# Patient Record
Sex: MALE | Race: WHITE | NOT HISPANIC OR LATINO | Employment: FULL TIME | URBAN - METROPOLITAN AREA
[De-identification: names, ages, dates, MRNs, and addresses within clinical notes are randomized per-mention and may not be internally consistent; named-entity substitution may affect disease eponyms.]

---

## 2017-04-20 ENCOUNTER — OFFICE VISIT (OUTPATIENT)
Dept: URGENT CARE | Facility: MEDICAL CENTER | Age: 47
End: 2017-04-20
Payer: COMMERCIAL

## 2017-04-20 ENCOUNTER — HOSPITAL ENCOUNTER (OUTPATIENT)
Dept: RADIOLOGY | Facility: MEDICAL CENTER | Age: 47
Discharge: HOME | End: 2017-04-20
Admitting: FAMILY MEDICINE
Payer: COMMERCIAL

## 2017-04-20 DIAGNOSIS — R10.9 ABDOMINAL PAIN: ICD-10-CM

## 2017-04-20 PROCEDURE — 99203 OFFICE O/P NEW LOW 30 MIN: CPT

## 2017-04-20 PROCEDURE — 81002 URINALYSIS NONAUTO W/O SCOPE: CPT

## 2017-07-16 ENCOUNTER — OFFICE VISIT (OUTPATIENT)
Dept: URGENT CARE | Facility: MEDICAL CENTER | Age: 47
End: 2017-07-16
Payer: COMMERCIAL

## 2017-07-16 PROCEDURE — 12042 INTMD RPR N-HF/GENIT2.6-7.5: CPT

## 2017-07-16 PROCEDURE — 99213 OFFICE O/P EST LOW 20 MIN: CPT

## 2017-07-27 ENCOUNTER — APPOINTMENT (OUTPATIENT)
Dept: LAB | Facility: HOSPITAL | Age: 47
End: 2017-07-27
Payer: COMMERCIAL

## 2017-07-27 ENCOUNTER — OFFICE VISIT (OUTPATIENT)
Dept: URGENT CARE | Facility: MEDICAL CENTER | Age: 47
End: 2017-07-27
Payer: COMMERCIAL

## 2017-07-27 DIAGNOSIS — S61.212A LACERATION OF RIGHT MIDDLE FINGER WITHOUT FOREIGN BODY WITHOUT DAMAGE TO NAIL: ICD-10-CM

## 2017-07-27 PROCEDURE — 87077 CULTURE AEROBIC IDENTIFY: CPT

## 2017-07-27 PROCEDURE — 87070 CULTURE OTHR SPECIMN AEROBIC: CPT

## 2017-07-27 PROCEDURE — 87186 SC STD MICRODIL/AGAR DIL: CPT

## 2017-07-27 PROCEDURE — 87205 SMEAR GRAM STAIN: CPT

## 2017-07-27 PROCEDURE — 99213 OFFICE O/P EST LOW 20 MIN: CPT

## 2017-07-29 ENCOUNTER — GENERIC CONVERSION - ENCOUNTER (OUTPATIENT)
Dept: OTHER | Facility: OTHER | Age: 47
End: 2017-07-29

## 2017-07-29 LAB
BACTERIA WND AEROBE CULT: NORMAL
BACTERIA WND AEROBE CULT: NORMAL
GRAM STN SPEC: NORMAL
GRAM STN SPEC: NORMAL

## 2018-01-11 NOTE — MISCELLANEOUS
Message  I spoke with the patient  I informed them that wound culture result reveals resistant to amoxicillin which she is currently taking and I need to switch his antibiotic  I called in prescription to his local pharmacy which she is to take his first dose tonight  He expressed understanding        Signatures   Electronically signed by : LIZA Russell ; Jul 29 2017 10:29AM EST                       (Author)

## 2019-02-18 ENCOUNTER — TELEPHONE (OUTPATIENT)
Dept: CARDIOLOGY CLINIC | Facility: CLINIC | Age: 49
End: 2019-02-18

## 2019-02-18 NOTE — TELEPHONE ENCOUNTER
Pt requesting lab slip for labs to be done while he is in [place]   No orders in Epic please advise what labs you would like and I will mail slip to patient in [facility]LA---[phone] Mistry [address] [facility]

## 2021-07-27 ENCOUNTER — OFFICE VISIT (OUTPATIENT)
Dept: URGENT CARE | Facility: CLINIC | Age: 51
End: 2021-07-27
Payer: COMMERCIAL

## 2021-07-27 VITALS
TEMPERATURE: 98.3 F | RESPIRATION RATE: 18 BRPM | HEART RATE: 59 BPM | DIASTOLIC BLOOD PRESSURE: 58 MMHG | SYSTOLIC BLOOD PRESSURE: 119 MMHG | OXYGEN SATURATION: 98 %

## 2021-07-27 DIAGNOSIS — L95.9 CUTANEOUS VASCULITIS: Primary | ICD-10-CM

## 2021-07-27 PROCEDURE — 99213 OFFICE O/P EST LOW 20 MIN: CPT | Performed by: PHYSICIAN ASSISTANT

## 2021-07-27 NOTE — PROGRESS NOTES
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]  1  Cutaneous vasculitis         Patient Instructions   Patient Instructions     This appears to be a cutaneous vasculitis probably related to your COVID vaccine  You have pictures on your phone from when it appeared to be the worst   Save these for documentation  Call your primary care provider to arrange follow-up for the next 5-7 days  You may discuss with your PCP about getting 2nd COVID vaccine  Your primary care provider may want you to see a dermatologist       Recommend taking any kind of medication that may increase bleeding such as ibuprofen, aspirin products  Tylenol safe to take  If skin becomes itchy may use over-the-counter cortisone cream but otherwise do not have to use any medications at this time  If you would develop any sudden weakness, dizziness, unusual bleeding proceed to emergency room for further evaluation  Chief Complaint     Chief Complaint   Patient presents with    Rash     Pt states had his first covid vaccine on friday, saturday developed flu like symptoms and  was recovering states monday he felt fine and went to work  After coming home family members noticed rash to bilateral lower legs  Denies pain or itching  History of Present Illness   Kala Hilario presents to the clinic c/o    59-year-old male comes in with rash bilateral lower legs that seem to be 1st seen on Monday after he got home from work  Asymptomatic  Feeling well overall  Patient had his 1st COVID vaccine on Friday evening around 530  By Saturday afternoon he was down and out with headache, body aches and pains, fever, chills  By  he was feeling better  Denies any active bleeding that he is aware of  Did have dark formed bowel movement once on Saturday    No weakness, chest pain, shortness of breath  No prolonged sun exposure  Review of Systems   Review of Systems   Constitutional: Negative  Respiratory: Negative  Cardiovascular: Negative  Gastrointestinal: Negative for abdominal pain  Skin: Positive for rash  Current Medications     No long-term medications on file  Current Allergies     Allergies as of 07/27/2021    (No Known Allergies)          The following portions of the patient's history were reviewed and updated as appropriate: allergies, current medications, past family history, past medical history, past social history, past surgical history and problem list   History reviewed  No pertinent past medical history  History reviewed  No pertinent surgical history  History reviewed  No pertinent family history  Objective   /58   Pulse 59   Temp 98 3 °F (36 8 °C)   Resp 18   SpO2 98%   No LMP for male patient  Physical Exam     Physical Exam  Vitals and nursing note reviewed  Constitutional:       General: He is not in acute distress  Appearance: Normal appearance  He is well-developed  He is not ill-appearing, toxic-appearing or diaphoretic  Comments: Accompanied by wife   HENT:      Head: Normocephalic and atraumatic  Eyes:      General: No scleral icterus  Extraocular Movements: Extraocular movements intact  Conjunctiva/sclera: Conjunctivae normal       Pupils: Pupils are equal, round, and reactive to light  Cardiovascular:      Rate and Rhythm: Normal rate and regular rhythm  Heart sounds: Normal heart sounds  No murmur heard  No friction rub  No gallop  Pulmonary:      Effort: Pulmonary effort is normal  No respiratory distress  Breath sounds: Normal breath sounds  No stridor  No wheezing, rhonchi or rales  Abdominal:      Palpations: Abdomen is soft  There is no mass  Tenderness: There is no abdominal tenderness  There is no guarding        Comments: No hepatosplenomegaly Skin:     General: Skin is warm and dry  Findings: Erythema and rash present  Comments: Nonblanching purpura noted bilateral legs proximal to sock line and distal to knees  No similar  Lesions noted arms, chest, back, face  No similar lesions on thighs  Neurological:      Mental Status: He is alert and oriented to person, place, and time

## 2021-07-27 NOTE — PATIENT INSTRUCTIONS
This appears to be a cutaneous vasculitis probably related to your COVID vaccine  You have pictures on your phone from when it appeared to be the worst   Save these for documentation  Call your primary care provider to arrange follow-up for the next 5-7 days  You may discuss with your PCP about getting 2nd COVID vaccine  Your primary care provider may want you to see a dermatologist       Recommend taking any kind of medication that may increase bleeding such as ibuprofen, aspirin products  Tylenol safe to take  If skin becomes itchy may use over-the-counter cortisone cream but otherwise do not have to use any medications at this time  If you would develop any sudden weakness, dizziness, unusual bleeding proceed to emergency room for further evaluation

## 2024-03-19 ENCOUNTER — APPOINTMENT (OUTPATIENT)
Dept: URGENT CARE | Facility: MEDICAL CENTER | Age: 54
End: 2024-03-19

## 2024-07-10 ENCOUNTER — OFFICE VISIT (OUTPATIENT)
Dept: FAMILY MEDICINE CLINIC | Facility: CLINIC | Age: 54
End: 2024-07-10
Payer: COMMERCIAL

## 2024-07-10 VITALS
HEART RATE: 64 BPM | BODY MASS INDEX: 26.74 KG/M2 | DIASTOLIC BLOOD PRESSURE: 58 MMHG | SYSTOLIC BLOOD PRESSURE: 110 MMHG | WEIGHT: 197.4 LBS | TEMPERATURE: 98.5 F | OXYGEN SATURATION: 98 % | HEIGHT: 72 IN

## 2024-07-10 DIAGNOSIS — Z12.11 SCREEN FOR COLON CANCER: ICD-10-CM

## 2024-07-10 DIAGNOSIS — Z13.1 SCREENING FOR DIABETES MELLITUS: ICD-10-CM

## 2024-07-10 DIAGNOSIS — Z13.0 SCREENING FOR DEFICIENCY ANEMIA: ICD-10-CM

## 2024-07-10 DIAGNOSIS — Z00.00 ANNUAL PHYSICAL EXAM: Primary | ICD-10-CM

## 2024-07-10 DIAGNOSIS — Z13.29 SCREENING FOR THYROID DISORDER: ICD-10-CM

## 2024-07-10 DIAGNOSIS — Z13.6 SCREENING FOR CARDIOVASCULAR CONDITION: ICD-10-CM

## 2024-07-10 DIAGNOSIS — Z12.5 SCREENING FOR PROSTATE CANCER: ICD-10-CM

## 2024-07-10 PROCEDURE — 99386 PREV VISIT NEW AGE 40-64: CPT | Performed by: FAMILY MEDICINE

## 2024-07-10 NOTE — PROGRESS NOTES
Adult Annual Physical  Name: Darien Seth      : 1970      MRN: [id number]  Encounter Provider: Jovanni Ozuna DO  Encounter Date: 7/10/2024   Encounter department: [facility]    Assessment & Plan   1. Annual physical exam  Assessment & Plan:  Patient was seen for annual physical and introductory visit.  She has no complaints.  Past medical history reviewed.  Immunizations discussed.  He is due for shingles but would like to defer for the time being.  We will refer him for routine baseline screening colonoscopy and for lab work.  2. Screen for colon cancer  -     Ambulatory Referral to Gastroenterology; Future  3. Screening for deficiency anemia  -     CBC and differential; Future  -     CBC and differential  4. Screening for diabetes mellitus  -     Comprehensive metabolic panel; Future  -     Comprehensive metabolic panel  5. Screening for prostate cancer  -     PSA, Total Screen; Future  -     PSA, Total Screen  6. Screening for thyroid disorder  -     TSH, 3rd generation with Free T4 reflex; Future  -     TSH, 3rd generation with Free T4 reflex  7. Screening for cardiovascular condition  -     Lipid Panel with Direct LDL reflex; Future  -     Lipid Panel with Direct LDL reflex    Immunizations and preventive care screenings were discussed with patient today. Appropriate education was printed on patient's after visit summary.    Discussed risks and benefits of prostate cancer screening. We discussed the controversial history of PSA screening for prostate cancer in the [place] as well as the risk of over detection and over treatment of prostate cancer by way of PSA screening.  The patient understands that PSA blood testing is an imperfect way to screen for prostate cancer and that elevated PSA levels in the blood may also be caused by infection, inflammation, prostatic trauma or manipulation, urological procedures, or by benign prostatic enlargement.    The role of the  digital rectal examination in prostate cancer screening was also discussed and I discussed with him that there is large interobserver variability in the findings of digital rectal examination.    Counseling:  Alcohol/drug use: discussed moderation in alcohol intake, the recommendations for healthy alcohol use, and avoidance of illicit drug use.  Dental Health: discussed importance of regular tooth brushing, flossing, and dental visits.  Injury prevention: discussed safety/seat belts, safety helmets, smoke detectors, carbon dioxide detectors, and smoking near bedding or upholstery.  Exercise: the importance of regular exercise/physical activity was discussed. Recommend exercise 3-5 times per week for at least 30 minutes.          History of Present Illness     Adult Annual Physical:  Patient presents for annual physical.     Diet and Physical Activity:  - Diet/Nutrition: low fat diet. limited veg  - Exercise: 5-7 times a week on average, strength training exercises and moderate cardiovascular exercise.    Depression Screening:  - PHQ-2 Score: 0    General Health:  - Sleep: 4-6 hours of sleep on average.  - Hearing: normal hearing bilateral ears.  - Vision: no vision problems and most recent eye exam < 1 year ago.  - Dental: regular dental visits.     Health:    - Urinary symptoms: none.     Review of Systems   Constitutional: Negative.    HENT: Negative.  Negative for congestion, ear pain, hearing loss, nosebleeds, sore throat and trouble swallowing.    Eyes: Negative.    Respiratory:  Negative for apnea, cough, chest tightness, shortness of breath and wheezing.    Cardiovascular: Negative.    Gastrointestinal:  Negative for abdominal pain, blood in stool, constipation, diarrhea, nausea and vomiting.   Endocrine: Negative.    Genitourinary:  Negative for difficulty urinating, dysuria, frequency, hematuria and urgency.   Musculoskeletal:  Negative for arthralgias, joint swelling and myalgias.   Skin:  Negative for  "rash.   Neurological:  Negative for dizziness, syncope, light-headedness, numbness and headaches.   Hematological: Negative.    Psychiatric/Behavioral:  Negative for confusion, dysphoric mood and sleep disturbance. The patient is not nervous/anxious.          Objective     /58 (BP Location: Left arm, Patient Position: Sitting, Cuff Size: Adult)   Pulse 64   Temp 98.5 °F (36.9 °C)   Ht 5' 11.65\" (1.82 m)   Wt 89.5 kg (197 lb 6.4 oz)   SpO2 98%   BMI 27.03 kg/m²     Physical Exam  Vitals and nursing note reviewed.   Constitutional:       Appearance: Normal appearance. He is well-developed.   HENT:      Head: Normocephalic.      Right Ear: External ear normal.      Left Ear: External ear normal.      Nose: Nose normal.      Mouth/Throat:      Mouth: Mucous membranes are moist.   Eyes:      Conjunctiva/sclera: Conjunctivae normal.      Pupils: Pupils are equal, round, and reactive to light.   Cardiovascular:      Rate and Rhythm: Normal rate and regular rhythm.      Heart sounds: Normal heart sounds.   Pulmonary:      Effort: Pulmonary effort is normal.      Breath sounds: Normal breath sounds.   Abdominal:      General: Bowel sounds are normal.      Palpations: Abdomen is soft.   Musculoskeletal:         General: Normal range of motion.      Cervical back: Normal range of motion and neck supple.   Skin:     General: Skin is warm and dry.   Neurological:      Mental Status: He is alert.   Psychiatric:         Mood and Affect: Mood normal.         Behavior: Behavior normal.         Thought Content: Thought content normal.         Judgment: Judgment normal.         "

## 2024-07-10 NOTE — ASSESSMENT & PLAN NOTE
Patient was seen for annual physical and introductory visit.  She has no complaints.  Past medical history reviewed.  Immunizations discussed.  He is due for shingles but would like to defer for the time being.  We will refer him for routine baseline screening colonoscopy and for lab work.

## 2024-07-27 LAB
ALBUMIN SERPL-MCNC: 4.3 G/DL (ref 3.5–5.7)
ALP SERPL-CCNC: 53 U/L (ref 35–120)
ALT SERPL-CCNC: 14 U/L
ANION GAP SERPL CALCULATED.3IONS-SCNC: 7 MMOL/L (ref 3–11)
AST SERPL-CCNC: 20 U/L
BASOPHILS # BLD AUTO: 0 THOU/CMM (ref 0–0.1)
BASOPHILS NFR BLD AUTO: 1 %
BILIRUB SERPL-MCNC: 0.6 MG/DL (ref 0.2–1)
BUN SERPL-MCNC: 17 MG/DL (ref 7–28)
CALCIUM SERPL-MCNC: 9 MG/DL (ref 8.5–10.1)
CHLORIDE SERPL-SCNC: 104 MMOL/L (ref 100–109)
CHOLEST SERPL-MCNC: 150 MG/DL
CHOLEST/HDLC SERPL: 3.1 {RATIO}
CO2 SERPL-SCNC: 30 MMOL/L (ref 21–31)
CREAT SERPL-MCNC: 1.04 MG/DL (ref 0.53–1.3)
CYTOLOGY CMNT CVX/VAG CYTO-IMP: ABNORMAL
DIFFERENTIAL METHOD BLD: NORMAL
EOSINOPHIL # BLD AUTO: 0.2 THOU/CMM (ref 0–0.5)
EOSINOPHIL NFR BLD AUTO: 3 %
ERYTHROCYTE [DISTWIDTH] IN BLOOD BY AUTOMATED COUNT: 13.8 % (ref 12–16)
GFR/BSA.PRED SERPLBLD CYS-BASED-ARV: 85 ML/MIN/{1.73_M2}
GLUCOSE SERPL-MCNC: 84 MG/DL (ref 65–99)
HCT VFR BLD AUTO: 42.2 % (ref 37–48)
HDLC SERPL-MCNC: 48 MG/DL (ref 23–92)
HGB BLD-MCNC: 14.4 G/DL (ref 12.5–17)
LDLC SERPL CALC-MCNC: 86 MG/DL
LYMPHOCYTES # BLD AUTO: 1.7 THOU/CMM (ref 1–3)
LYMPHOCYTES NFR BLD AUTO: 31 %
MCH RBC QN AUTO: 30 PG (ref 27–36)
MCHC RBC AUTO-ENTMCNC: 34.1 G/DL (ref 32–37)
MCV RBC AUTO: 88 FL (ref 80–100)
MONOCYTES # BLD AUTO: 0.7 THOU/CMM (ref 0.3–1)
MONOCYTES NFR BLD AUTO: 12 %
NEUTROPHILS # BLD AUTO: 3 THOU/CMM (ref 1.8–7.8)
NEUTROPHILS NFR BLD AUTO: 53 %
NONHDLC SERPL-MCNC: 102 MG/DL
PLATELET # BLD AUTO: 304 THOU/CMM (ref 140–350)
PMV BLD REES-ECKER: 8.8 FL (ref 7.5–11.3)
POTASSIUM SERPL-SCNC: 4.9 MMOL/L (ref 3.5–5.2)
PROT SERPL-MCNC: 6.1 G/DL (ref 6.3–8.3)
PSA SERPL-MCNC: 0.64 NG/ML
RBC # BLD AUTO: 4.8 MILL/CMM (ref 4–5.4)
SODIUM SERPL-SCNC: 141 MMOL/L (ref 135–145)
TRIGL SERPL-MCNC: 81 MG/DL
TSH SERPL-ACNC: 3.7 UIU/ML (ref 0.45–5.33)
WBC # BLD AUTO: 5.6 THOU/CMM (ref 4–10.5)

## 2024-08-06 ENCOUNTER — OFFICE VISIT (OUTPATIENT)
Dept: GASTROENTEROLOGY | Facility: CLINIC | Age: 54
End: 2024-08-06
Payer: COMMERCIAL

## 2024-08-06 VITALS
SYSTOLIC BLOOD PRESSURE: 124 MMHG | TEMPERATURE: 97.8 F | HEART RATE: 57 BPM | DIASTOLIC BLOOD PRESSURE: 58 MMHG | WEIGHT: 194 LBS | HEIGHT: 73 IN | OXYGEN SATURATION: 99 % | BODY MASS INDEX: 25.71 KG/M2

## 2024-08-06 DIAGNOSIS — Z12.11 SCREEN FOR COLON CANCER: ICD-10-CM

## 2024-08-06 PROCEDURE — 99243 OFF/OP CNSLTJ NEW/EST LOW 30: CPT | Performed by: INTERNAL MEDICINE

## 2024-08-06 RX ORDER — RIBOFLAVIN (VITAMIN B2) 100 MG
100 TABLET ORAL DAILY
COMMUNITY

## 2024-08-06 NOTE — H&P (VIEW-ONLY)
[facility] - Outpatient Consultation  Darien Seth 54 y.o. male MRN: [id number]  Encounter: [id number]    HPI:    Darien Seth is a 54 y.o. male who presents to schedule index screening colonoscopy. He has never had colon cancer screening. No FH of CRC. Normal bowel function. He weathers not take medications. Does not smoke. No significant medical history. Works as .    Referred by Dr. Ozuna.    REVIEW OF SYSTEMS:  CONSTITUTIONAL: Denies any fever, chills, rigors, and weight loss.  HEENT: No earache or tinnitus. Denies hearing loss or visual disturbances.  CARDIOVASCULAR: No chest pain or palpitations.   RESPIRATORY: Denies any cough, hemoptysis, shortness of breath or dyspnea on exertion.  GASTROINTESTINAL: As noted in the History of Present Illness.   GENITOURINARY: No problems with urination. Denies any hematuria or dysuria.  NEUROLOGIC: No dizziness or vertigo, denies headaches.   MUSCULOSKELETAL: Denies any muscle or joint pain.   SKIN: Denies skin rashes or itching.   ENDOCRINE: Denies excessive thirst. Denies intolerance to heat or cold.  PSYCHOSOCIAL: Denies depression or anxiety. Denies any recent memory loss.     Historical Information   Past Medical History:   Diagnosis Date    History of chickenpox     History of shingles      Past Surgical History:   Procedure Laterality Date    CIRCUMCISION      KNEE SURGERY       Social History   Social History     Substance and Sexual Activity   Alcohol Use Yes    Comment: social     Social History     Substance and Sexual Activity   Drug Use Never     Social History     Tobacco Use   Smoking Status Former    Current packs/day: 0.00    Average packs/day: 1 pack/day for 8.0 years (8.0 ttl pk-yrs)    Types: Cigarettes    Start date:     Quit date:     Years since quittin.6   Smokeless Tobacco Never     Family History   Problem Relation Age of Onset    Hyperlipidemia Mother     Hypertension Mother           "of a stroke at 57    Stroke Mother     Heart disease Mother     Prostate cancer Father     Hearing loss Father         Age in 80's    No Known Problems Sister     No Known Problems Sister     No Known Problems Sister     No Known Problems Brother     No Known Problems Son        Meds/Allergies       Current Outpatient Medications:     Ascorbic Acid (vitamin C) 100 MG tablet    Multiple Vitamin (MULTIVITAMIN ADULT PO)    No Known Allergies    Objective   Blood pressure 124/58, pulse 57, temperature 97.8 °F (36.6 °C), temperature source Tympanic, height 6' 1\" (1.854 m), weight 88 kg (194 lb), SpO2 99%. Body mass index is 25.6 kg/m².  PHYSICAL EXAM:    General Appearance:   Alert, cooperative, no distress   HEENT:   Normocephalic, atraumatic, anicteric.     Neck:  Supple, symmetrical, trachea midline   Lungs:   Clear to auscultation bilaterally; no rales, rhonchi or wheezing; respirations unlabored    Heart::   Regular rate and rhythm; no murmur, rub, or gallop.   Abdomen:   Soft, non-tender, non-distended; normal bowel sounds; no masses, no organomegaly    Genitalia:   Deferred    Rectal:   Deferred    Extremities:  No cyanosis, clubbing or edema    Pulses:  2+ and symmetric    Skin:  No jaundice, rashes, or lesions    Lymph nodes:  No palpable cervical lymphadenopathy        Lab Results:   No visits with results within 1 Day(s) from this visit.   Latest known visit with results is:   Office Visit on 07/10/2024   Component Date Value    Hemoglobin 07/27/2024 14.4     HCT 07/27/2024 42.2     White Blood Cell Count 07/27/2024 5.6     Red Blood Cell Count 07/27/2024 4.80     Platelet Count 07/27/2024 304     SL AMB MPV 07/27/2024 8.8     MCV 07/27/2024 88     MCH 07/27/2024 30.0     MCHC 07/27/2024 34.1     RDW 07/27/2024 13.8     Differential Type 07/27/2024 AUTO     Neutrophils (Absolute) 07/27/2024 3.0     Lymphocytes (Absolute) 07/27/2024 1.7     Monocytes (Absolute) 07/27/2024 0.7     Eosinophils (Absolute) " "07/27/2024 0.2     Basophils ABS 07/27/2024 0.0     Neutrophils 07/27/2024 53     Lymphocytes 07/27/2024 31     Monocytes 07/27/2024 12     Eosinophils 07/27/2024 3     Basophils PCT 07/27/2024 1     Glucose, Random 07/27/2024 84     BUN 07/27/2024 17     Creatinine 07/27/2024 1.04     Sodium 07/27/2024 141     Potassium 07/27/2024 4.9     Chloride 07/27/2024 104     CO2 07/27/2024 30     Calcium 07/27/2024 9.0     Alkaline Phosphatase 07/27/2024 53     Albumin 07/27/2024 4.3     TOTAL BILIRUBIN 07/27/2024 0.6     Protein, Total 07/27/2024 6.1 (L)     AST 07/27/2024 20     ALT 07/27/2024 14     ANION GAP 07/27/2024 7     eGFR 07/27/2024 85     Comment 07/27/2024 (Note)     Cholesterol, Total 07/27/2024 150     Triglycerides 07/27/2024 81     HDL Cholesterol 07/27/2024 48     Non HDL Chol. (LDL+VLDL) 07/27/2024 102     Chol HDLC Ratio 07/27/2024 3.1     LDL Calculated 07/27/2024 86     TSH 07/27/2024 3.70     Prostate Specific Antige* 07/27/2024 0.64        Lab Results   Component Value Date    WBC 5.6 07/27/2024    HGB 14.4 07/27/2024    HCT 42.2 07/27/2024    MCV 88 07/27/2024     07/27/2024       Lab Results   Component Value Date    SODIUM 141 07/27/2024    K 4.9 07/27/2024     07/27/2024    CO2 30 07/27/2024    AGAP 7 07/27/2024    BUN 17 07/27/2024    CREATININE 1.04 07/27/2024    GLUC 84 07/27/2024    CALCIUM 9.0 07/27/2024    AST 20 07/27/2024    ALT 14 07/27/2024    ALKPHOS 53 07/27/2024    TP 6.1 (L) 07/27/2024    TBILI 0.6 07/27/2024    EGFR 85 07/27/2024       No results found for: \"CRP\"    Lab Results   Component Value Date    TSH 3.70 07/27/2024       No results found for: \"IRON\", \"TIBC\", \"FERRITIN\"    Radiology Results:   No results found.    ______________________________________________________________________  ASSESSMENT AND PLAN:    Darien Abbott Seth is a 54 y.o. male who presents to schedule first screening colonoscopy. He is ad average risk for colorectal cancer. I discussed the " risks of bleeding, infection, and perforation associated with endoscopic procedures. We will schedule his procedure.    1. Screen for colon cancer        Orders Placed This Encounter   Procedures    Colonoscopy

## 2024-08-06 NOTE — PROGRESS NOTES
[facility] - Outpatient Consultation  aDrien Seth 54 y.o. male MRN: [id number]  Encounter: [id number]    HPI:    Darien Seth is a 54 y.o. male who presents to schedule index screening colonoscopy. He has never had colon cancer screening. No FH of CRC. Normal bowel function. He weathers not take medications. Does not smoke. No significant medical history. Works as .    Referred by Dr. Ozuna.    REVIEW OF SYSTEMS:  CONSTITUTIONAL: Denies any fever, chills, rigors, and weight loss.  HEENT: No earache or tinnitus. Denies hearing loss or visual disturbances.  CARDIOVASCULAR: No chest pain or palpitations.   RESPIRATORY: Denies any cough, hemoptysis, shortness of breath or dyspnea on exertion.  GASTROINTESTINAL: As noted in the History of Present Illness.   GENITOURINARY: No problems with urination. Denies any hematuria or dysuria.  NEUROLOGIC: No dizziness or vertigo, denies headaches.   MUSCULOSKELETAL: Denies any muscle or joint pain.   SKIN: Denies skin rashes or itching.   ENDOCRINE: Denies excessive thirst. Denies intolerance to heat or cold.  PSYCHOSOCIAL: Denies depression or anxiety. Denies any recent memory loss.     Historical Information   Past Medical History:   Diagnosis Date    History of chickenpox     History of shingles      Past Surgical History:   Procedure Laterality Date    CIRCUMCISION      KNEE SURGERY       Social History   Social History     Substance and Sexual Activity   Alcohol Use Yes    Comment: social     Social History     Substance and Sexual Activity   Drug Use Never     Social History     Tobacco Use   Smoking Status Former    Current packs/day: 0.00    Average packs/day: 1 pack/day for 8.0 years (8.0 ttl pk-yrs)    Types: Cigarettes    Start date:     Quit date:     Years since quittin.6   Smokeless Tobacco Never     Family History   Problem Relation Age of Onset    Hyperlipidemia Mother     Hypertension Mother           "of a stroke at 57    Stroke Mother     Heart disease Mother     Prostate cancer Father     Hearing loss Father         Age in 80's    No Known Problems Sister     No Known Problems Sister     No Known Problems Sister     No Known Problems Brother     No Known Problems Son        Meds/Allergies       Current Outpatient Medications:     Ascorbic Acid (vitamin C) 100 MG tablet    Multiple Vitamin (MULTIVITAMIN ADULT PO)    No Known Allergies    Objective   Blood pressure 124/58, pulse 57, temperature 97.8 °F (36.6 °C), temperature source Tympanic, height 6' 1\" (1.854 m), weight 88 kg (194 lb), SpO2 99%. Body mass index is 25.6 kg/m².  PHYSICAL EXAM:    General Appearance:   Alert, cooperative, no distress   HEENT:   Normocephalic, atraumatic, anicteric.     Neck:  Supple, symmetrical, trachea midline   Lungs:   Clear to auscultation bilaterally; no rales, rhonchi or wheezing; respirations unlabored    Heart::   Regular rate and rhythm; no murmur, rub, or gallop.   Abdomen:   Soft, non-tender, non-distended; normal bowel sounds; no masses, no organomegaly    Genitalia:   Deferred    Rectal:   Deferred    Extremities:  No cyanosis, clubbing or edema    Pulses:  2+ and symmetric    Skin:  No jaundice, rashes, or lesions    Lymph nodes:  No palpable cervical lymphadenopathy        Lab Results:   No visits with results within 1 Day(s) from this visit.   Latest known visit with results is:   Office Visit on 07/10/2024   Component Date Value    Hemoglobin 07/27/2024 14.4     HCT 07/27/2024 42.2     White Blood Cell Count 07/27/2024 5.6     Red Blood Cell Count 07/27/2024 4.80     Platelet Count 07/27/2024 304     SL AMB MPV 07/27/2024 8.8     MCV 07/27/2024 88     MCH 07/27/2024 30.0     MCHC 07/27/2024 34.1     RDW 07/27/2024 13.8     Differential Type 07/27/2024 AUTO     Neutrophils (Absolute) 07/27/2024 3.0     Lymphocytes (Absolute) 07/27/2024 1.7     Monocytes (Absolute) 07/27/2024 0.7     Eosinophils (Absolute) " "07/27/2024 0.2     Basophils ABS 07/27/2024 0.0     Neutrophils 07/27/2024 53     Lymphocytes 07/27/2024 31     Monocytes 07/27/2024 12     Eosinophils 07/27/2024 3     Basophils PCT 07/27/2024 1     Glucose, Random 07/27/2024 84     BUN 07/27/2024 17     Creatinine 07/27/2024 1.04     Sodium 07/27/2024 141     Potassium 07/27/2024 4.9     Chloride 07/27/2024 104     CO2 07/27/2024 30     Calcium 07/27/2024 9.0     Alkaline Phosphatase 07/27/2024 53     Albumin 07/27/2024 4.3     TOTAL BILIRUBIN 07/27/2024 0.6     Protein, Total 07/27/2024 6.1 (L)     AST 07/27/2024 20     ALT 07/27/2024 14     ANION GAP 07/27/2024 7     eGFR 07/27/2024 85     Comment 07/27/2024 (Note)     Cholesterol, Total 07/27/2024 150     Triglycerides 07/27/2024 81     HDL Cholesterol 07/27/2024 48     Non HDL Chol. (LDL+VLDL) 07/27/2024 102     Chol HDLC Ratio 07/27/2024 3.1     LDL Calculated 07/27/2024 86     TSH 07/27/2024 3.70     Prostate Specific Antige* 07/27/2024 0.64        Lab Results   Component Value Date    WBC 5.6 07/27/2024    HGB 14.4 07/27/2024    HCT 42.2 07/27/2024    MCV 88 07/27/2024     07/27/2024       Lab Results   Component Value Date    SODIUM 141 07/27/2024    K 4.9 07/27/2024     07/27/2024    CO2 30 07/27/2024    AGAP 7 07/27/2024    BUN 17 07/27/2024    CREATININE 1.04 07/27/2024    GLUC 84 07/27/2024    CALCIUM 9.0 07/27/2024    AST 20 07/27/2024    ALT 14 07/27/2024    ALKPHOS 53 07/27/2024    TP 6.1 (L) 07/27/2024    TBILI 0.6 07/27/2024    EGFR 85 07/27/2024       No results found for: \"CRP\"    Lab Results   Component Value Date    TSH 3.70 07/27/2024       No results found for: \"IRON\", \"TIBC\", \"FERRITIN\"    Radiology Results:   No results found.    ______________________________________________________________________  ASSESSMENT AND PLAN:    Darein Abbott Seth is a 54 y.o. male who presents to schedule first screening colonoscopy. He is ad average risk for colorectal cancer. I discussed the " risks of bleeding, infection, and perforation associated with endoscopic procedures. We will schedule his procedure.    1. Screen for colon cancer        Orders Placed This Encounter   Procedures    Colonoscopy

## 2024-08-16 ENCOUNTER — ANESTHESIA (OUTPATIENT)
Dept: ANESTHESIOLOGY | Facility: HOSPITAL | Age: 54
End: 2024-08-16

## 2024-08-16 ENCOUNTER — ANESTHESIA EVENT (OUTPATIENT)
Dept: ANESTHESIOLOGY | Facility: HOSPITAL | Age: 54
End: 2024-08-16

## 2024-08-26 ENCOUNTER — TELEPHONE (OUTPATIENT)
Dept: GASTROENTEROLOGY | Facility: CLINIC | Age: 54
End: 2024-08-26

## 2024-08-26 NOTE — TELEPHONE ENCOUNTER
Confirming Upcoming Procedure: colonoscopy on 08/30/2024  Physician performing: Dr Todd  Location of procedure:  [facility]   Prep: [facility] prep     Spoke with patient all question answer. Patient needs a morning appointment if possible

## 2024-08-29 RX ORDER — SODIUM CHLORIDE 9 MG/ML
125 INJECTION, SOLUTION INTRAVENOUS CONTINUOUS
Status: CANCELLED | OUTPATIENT
Start: 2024-08-29

## 2024-08-30 ENCOUNTER — ANESTHESIA EVENT (OUTPATIENT)
Dept: GASTROENTEROLOGY | Facility: MEDICAL CENTER | Age: 54
End: 2024-08-30

## 2024-08-30 ENCOUNTER — HOSPITAL ENCOUNTER (OUTPATIENT)
Dept: GASTROENTEROLOGY | Facility: MEDICAL CENTER | Age: 54
Setting detail: OUTPATIENT SURGERY
End: 2024-08-30
Payer: COMMERCIAL

## 2024-08-30 ENCOUNTER — ANESTHESIA (OUTPATIENT)
Dept: GASTROENTEROLOGY | Facility: MEDICAL CENTER | Age: 54
End: 2024-08-30

## 2024-08-30 VITALS
WEIGHT: 194 LBS | TEMPERATURE: 96.7 F | HEART RATE: 50 BPM | OXYGEN SATURATION: 100 % | RESPIRATION RATE: 18 BRPM | DIASTOLIC BLOOD PRESSURE: 54 MMHG | HEIGHT: 73 IN | BODY MASS INDEX: 25.71 KG/M2 | SYSTOLIC BLOOD PRESSURE: 108 MMHG

## 2024-08-30 DIAGNOSIS — Z12.11 SCREEN FOR COLON CANCER: ICD-10-CM

## 2024-08-30 PROCEDURE — 45380 COLONOSCOPY AND BIOPSY: CPT | Performed by: INTERNAL MEDICINE

## 2024-08-30 PROCEDURE — 88305 TISSUE EXAM BY PATHOLOGIST: CPT | Performed by: STUDENT IN AN ORGANIZED HEALTH CARE EDUCATION/TRAINING PROGRAM

## 2024-08-30 RX ORDER — PROPOFOL 10 MG/ML
INJECTION, EMULSION INTRAVENOUS AS NEEDED
Status: DISCONTINUED | OUTPATIENT
Start: 2024-08-30 | End: 2024-08-30

## 2024-08-30 RX ORDER — SODIUM CHLORIDE 9 MG/ML
INJECTION, SOLUTION INTRAVENOUS CONTINUOUS PRN
Status: DISCONTINUED | OUTPATIENT
Start: 2024-08-30 | End: 2024-08-30

## 2024-08-30 RX ORDER — SODIUM CHLORIDE 9 MG/ML
125 INJECTION, SOLUTION INTRAVENOUS CONTINUOUS
Status: SHIPPED | OUTPATIENT
Start: 2024-08-30

## 2024-08-30 RX ADMIN — PROPOFOL 50 MG: 10 INJECTION, EMULSION INTRAVENOUS at 09:28

## 2024-08-30 RX ADMIN — SODIUM CHLORIDE 125 ML/HR: 0.9 INJECTION, SOLUTION INTRAVENOUS at 09:12

## 2024-08-30 RX ADMIN — PROPOFOL 50 MG: 10 INJECTION, EMULSION INTRAVENOUS at 09:22

## 2024-08-30 RX ADMIN — PROPOFOL 30 MG: 10 INJECTION, EMULSION INTRAVENOUS at 09:35

## 2024-08-30 RX ADMIN — SODIUM CHLORIDE: 0.9 INJECTION, SOLUTION INTRAVENOUS at 09:17

## 2024-08-30 RX ADMIN — PROPOFOL 50 MG: 10 INJECTION, EMULSION INTRAVENOUS at 09:20

## 2024-08-30 RX ADMIN — PROPOFOL 100 MG: 10 INJECTION, EMULSION INTRAVENOUS at 09:17

## 2024-08-30 NOTE — ANESTHESIA PREPROCEDURE EVALUATION
Procedure:  COLONOSCOPY    Relevant Problems   No relevant active problems        Physical Exam    Airway    Mallampati score: II         Dental       Cardiovascular  Rhythm: regular, Rate: normal    Pulmonary   Breath sounds clear to auscultation    Other Findings        Anesthesia Plan  ASA Score- 1     Anesthesia Type- IV sedation with anesthesia with ASA Monitors.         Additional Monitors:     Airway Plan:            Plan Factors-Exercise tolerance (METS): >4 METS.    Chart reviewed.   Existing labs reviewed. Patient summary reviewed.    Patient is not a current smoker.      There is medical exclusion for perioperative obstructive sleep apnea risk education.        Induction- intravenous.    Postoperative Plan-     Perioperative Resuscitation Plan - Level 1 - Full Code.       Informed Consent- Anesthetic plan and risks discussed with patient.

## 2024-08-30 NOTE — ANESTHESIA POSTPROCEDURE EVALUATION
Post-Op Assessment Note    CV Status:  Stable  Pain Score: 0    Pain management: adequate       Mental Status:  Alert and awake   Hydration Status:  Euvolemic   PONV Controlled:  Controlled   Airway Patency:  Patent     Post Op Vitals Reviewed: Yes    No anethesia notable event occurred.    Staff: Anesthesiologist, CRNA               /51 (08/30/24 0941)    Temp      Pulse (!) 48 (08/30/24 0941)   Resp 19 (08/30/24 0941)    SpO2 100 % (08/30/24 0941)

## 2024-09-03 ENCOUNTER — RA CDI HCC (OUTPATIENT)
Dept: OTHER | Facility: HOSPITAL | Age: 54
End: 2024-09-03

## 2024-09-05 PROCEDURE — 88305 TISSUE EXAM BY PATHOLOGIST: CPT | Performed by: STUDENT IN AN ORGANIZED HEALTH CARE EDUCATION/TRAINING PROGRAM

## 2024-09-10 ENCOUNTER — APPOINTMENT (OUTPATIENT)
Dept: RADIOLOGY | Facility: CLINIC | Age: 54
End: 2024-09-10
Payer: COMMERCIAL

## 2024-09-10 ENCOUNTER — TELEPHONE (OUTPATIENT)
Dept: GASTROENTEROLOGY | Facility: CLINIC | Age: 54
End: 2024-09-10

## 2024-09-10 ENCOUNTER — OFFICE VISIT (OUTPATIENT)
Dept: FAMILY MEDICINE CLINIC | Facility: CLINIC | Age: 54
End: 2024-09-10
Payer: COMMERCIAL

## 2024-09-10 VITALS
OXYGEN SATURATION: 96 % | DIASTOLIC BLOOD PRESSURE: 64 MMHG | WEIGHT: 193 LBS | HEART RATE: 61 BPM | SYSTOLIC BLOOD PRESSURE: 110 MMHG | BODY MASS INDEX: 26.14 KG/M2 | TEMPERATURE: 98 F | HEIGHT: 72 IN

## 2024-09-10 DIAGNOSIS — M89.8X1 PAIN OF LEFT CLAVICLE: ICD-10-CM

## 2024-09-10 DIAGNOSIS — M89.8X1 PAIN OF LEFT CLAVICLE: Primary | ICD-10-CM

## 2024-09-10 PROCEDURE — 73000 X-RAY EXAM OF COLLAR BONE: CPT

## 2024-09-10 PROCEDURE — 99214 OFFICE O/P EST MOD 30 MIN: CPT
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
## 2024-09-10 NOTE — ASSESSMENT & PLAN NOTE
Patient with a bony prominence of the medial left clavicle on exam today, does not feel cystic on exam today.  This has been present for the past 2 weeks.  Will send patient for x-ray of the clavicle, suspect it may be a arthritic change in the sternoclavicular joint due to his recent vigorous activity.  Patient denies all B symptoms and there is no other lymphadenopathy felt on exam.  He recently had normal blood work completed as well.  Recommend warm compress and ibuprofen x 1 week to help with swelling.  Patient to monitor for any of the symptoms as we discussed today.  I will be in touch with him with results of the x-ray.  He is very agreeable with plan today and will follow-up as needed.    Orders:    XR clavicle left; Future

## 2024-09-18 DIAGNOSIS — M89.8X1 PAIN OF LEFT CLAVICLE: Primary | ICD-10-CM

## 2024-09-27 NOTE — TELEPHONE ENCOUNTER
Spoke with Pt. Pt states he has never had surgery on either shoulder or either clavicle. Pt states I went to the doctor because it feels like a bone was sticking out of the lower side of my neck. I stated understanding and we might be getting xrays of the actual shoulder at the office visit. Pt states understanding and thank you.

## 2024-10-07 ENCOUNTER — OFFICE VISIT (OUTPATIENT)
Dept: OBGYN CLINIC | Facility: MEDICAL CENTER | Age: 54
End: 2024-10-07
Payer: COMMERCIAL

## 2024-10-07 VITALS
HEART RATE: 65 BPM | DIASTOLIC BLOOD PRESSURE: 75 MMHG | WEIGHT: 200 LBS | BODY MASS INDEX: 27.09 KG/M2 | HEIGHT: 72 IN | SYSTOLIC BLOOD PRESSURE: 118 MMHG

## 2024-10-07 DIAGNOSIS — M85.612 CYST OF LEFT CLAVICLE: Primary | ICD-10-CM

## 2024-10-07 DIAGNOSIS — M89.8X1 PAIN OF LEFT CLAVICLE: ICD-10-CM

## 2024-10-07 DIAGNOSIS — M25.512 LEFT SHOULDER PAIN, UNSPECIFIED CHRONICITY: ICD-10-CM

## 2024-10-07 PROCEDURE — 99203 OFFICE O/P NEW LOW 30 MIN: CPT | Performed by: ORTHOPAEDIC SURGERY
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
## 2024-10-25 ENCOUNTER — HOSPITAL ENCOUNTER (OUTPATIENT)
Dept: ULTRASOUND IMAGING | Facility: MEDICAL CENTER | Age: 54
Discharge: HOME | End: 2024-10-25
Payer: COMMERCIAL

## 2024-10-25 DIAGNOSIS — M85.612 CYST OF LEFT CLAVICLE: ICD-10-CM

## 2024-10-25 PROCEDURE — 76705 ECHO EXAM OF ABDOMEN: CPT

## 2025-07-14 ENCOUNTER — OFFICE VISIT (OUTPATIENT)
Dept: FAMILY MEDICINE CLINIC | Facility: CLINIC | Age: 55
End: 2025-07-14
Payer: COMMERCIAL

## 2025-07-14 VITALS
WEIGHT: 192 LBS | BODY MASS INDEX: 26.01 KG/M2 | OXYGEN SATURATION: 98 % | HEIGHT: 72 IN | SYSTOLIC BLOOD PRESSURE: 114 MMHG | DIASTOLIC BLOOD PRESSURE: 72 MMHG | TEMPERATURE: 97.9 F | HEART RATE: 66 BPM

## 2025-07-14 DIAGNOSIS — Z13.0 SCREENING FOR DEFICIENCY ANEMIA: ICD-10-CM

## 2025-07-14 DIAGNOSIS — Z13.29 SCREENING FOR THYROID DISORDER: ICD-10-CM

## 2025-07-14 DIAGNOSIS — Z12.5 SCREENING FOR PROSTATE CANCER: ICD-10-CM

## 2025-07-14 DIAGNOSIS — Z13.6 SCREENING FOR CARDIOVASCULAR CONDITION: ICD-10-CM

## 2025-07-14 DIAGNOSIS — Z13.1 SCREENING FOR DIABETES MELLITUS: ICD-10-CM

## 2025-07-14 DIAGNOSIS — Z00.00 ANNUAL PHYSICAL EXAM: Primary | ICD-10-CM

## 2025-07-14 PROCEDURE — 99396 PREV VISIT EST AGE 40-64: CPT | Performed by: FAMILY MEDICINE

## 2025-07-14 NOTE — ASSESSMENT & PLAN NOTE
Physical exam unremarkable.  Immunizations up-to-date.  Patient declines shingles vaccine.  Colon cancer screening current.  Routine lab work ordered.

## 2025-07-14 NOTE — PROGRESS NOTES
Adult Annual Physical  Name: Darien Seth      : 1970      MRN: [id number]  Encounter Provider: Jovanni Ozuna DO  Encounter Date: 2025   Encounter department: [facility]    :  Assessment & Plan  Annual physical exam  Physical exam unremarkable.  Immunizations up-to-date.  Patient declines shingles vaccine.  Colon cancer screening current.  Routine lab work ordered.       Screening for deficiency anemia    Orders:  •  CBC and differential; Future    Screening for diabetes mellitus    Orders:  •  Comprehensive metabolic panel; Future    Screening for prostate cancer    Orders:  •  PSA, Total Screen; Future    Screening for thyroid disorder    Orders:  •  TSH, 3rd generation with Free T4 reflex; Future    Screening for cardiovascular condition    Orders:  •  Lipid Panel with Direct LDL reflex; Future        Preventive Screenings:    - Prostate cancer screening: screening up-to-date     Immunizations:  - Immunizations due: Prevnar 20 and Zoster (Shingrix)         History of Present Illness     Adult Annual Physical:  Patient presents for annual physical. Patient seen for annual physical exam.  He is on no medication.  Has no complaints.  Feels well.  Gets regular exercise and follows a reasonable diet..     Diet and Physical Activity:  - Diet/Nutrition: no special diet.  - Exercise: 5-7 times a week on average.    Depression Screening:  - PHQ-2 Score: 0    General Health:  - Sleep: sleeps well and 4-6 hours of sleep on average.  - Hearing: normal hearing bilateral ears.  - Vision: no vision problems.  - Dental: regular dental visits.     Health:  - History of STDs: no.   - Urinary symptoms: none.     Advanced Care Planning:  - Has an advanced directive?: no    - Has a durable medical POA?: no    - ACP document given to patient?: yes      Review of Systems   Constitutional: Negative.    Respiratory: Negative.     Cardiovascular: Negative.    Gastrointestinal: Negative.   "  Genitourinary: Negative.    Musculoskeletal: Negative.    Psychiatric/Behavioral: Negative.           Objective   /72 (BP Location: Left arm, Patient Position: Sitting, Cuff Size: Adult)   Pulse 66   Temp 97.9 °F (36.6 °C) (Temporal)   Ht 6' 0.4\" (1.839 m)   Wt 87.1 kg (192 lb)   SpO2 98%   BMI 25.75 kg/m²     Physical Exam  Vitals and nursing note reviewed.   Constitutional:       General: He is not in acute distress.     Appearance: Normal appearance. He is well-developed. He is not diaphoretic.   HENT:      Head: Normocephalic and atraumatic.     Eyes:      General:         Right eye: No discharge.      Conjunctiva/sclera: Conjunctivae normal.      Pupils: Pupils are equal, round, and reactive to light.     Neck:      Thyroid: No thyromegaly.     Cardiovascular:      Rate and Rhythm: Normal rate and regular rhythm.   Pulmonary:      Effort: Pulmonary effort is normal. No respiratory distress.      Breath sounds: Normal breath sounds.     Musculoskeletal:      Cervical back: Normal range of motion.   Lymphadenopathy:      Cervical: No cervical adenopathy.     Skin:     General: Skin is warm and dry.     Neurological:      Mental Status: He is alert and oriented to person, place, and time.     Psychiatric:         Mood and Affect: Mood normal.         Behavior: Behavior normal.         Thought Content: Thought content normal.         Judgment: Judgment normal.         "